# Patient Record
Sex: MALE | Race: BLACK OR AFRICAN AMERICAN | Employment: FULL TIME
[De-identification: names, ages, dates, MRNs, and addresses within clinical notes are randomized per-mention and may not be internally consistent; named-entity substitution may affect disease eponyms.]

---

## 2023-04-04 ENCOUNTER — HOSPITAL ENCOUNTER (EMERGENCY)
Facility: HOSPITAL | Age: 38
Discharge: HOME | End: 2023-04-04
Attending: STUDENT IN AN ORGANIZED HEALTH CARE EDUCATION/TRAINING PROGRAM
Payer: COMMERCIAL

## 2023-04-04 VITALS
RESPIRATION RATE: 18 BRPM | HEIGHT: 70 IN | OXYGEN SATURATION: 99 % | SYSTOLIC BLOOD PRESSURE: 131 MMHG | DIASTOLIC BLOOD PRESSURE: 92 MMHG | BODY MASS INDEX: 27.35 KG/M2 | HEART RATE: 82 BPM | TEMPERATURE: 97.9 F | WEIGHT: 191 LBS
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
## 2023-04-04 PROCEDURE — 99282 EMERGENCY DEPT VISIT SF MDM: CPT

## 2023-04-04 ASSESSMENT — PAIN - FUNCTIONAL ASSESSMENT: PAIN_FUNCTIONAL_ASSESSMENT: 0-10

## 2023-04-04 ASSESSMENT — PAIN SCALES - GENERAL: PAINLEVEL_OUTOF10: 8

## 2023-04-04 NOTE — Clinical Note
Taty Harris was seen and treated in our emergency department on 4/4/2023. He may return to work on 04/05/2023. Light duty for 3 days. No heavy lifting more than 15 lbs for 3 days     If you have any questions or concerns, please don't hesitate to call.       Lidia Kaur MD

## 2023-04-05 NOTE — ED PROVIDER NOTES
REFERRED TO:  [facility]  OFFICE  [address] [facility] Anu Negrete [address] [address] [phone]  In 1 week      [address] [facility]  [phone]  In 1 week        DISCHARGE MEDICATIONS:  New Prescriptions    No medications on file     Controlled Substances Monitoring:     No flowsheet data found.     (Please note that portions of this note were completed with a voice recognition program.  Efforts were made to edit the dictations but occasionally words are mis-transcribed.)    Santos Alberto MD (electronically signed)  Attending Emergency Physician           Santos Alberto MD  04/04/23 [id number]
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
